# Patient Record
Sex: FEMALE | Race: BLACK OR AFRICAN AMERICAN | Employment: FULL TIME | URBAN - METROPOLITAN AREA
[De-identification: names, ages, dates, MRNs, and addresses within clinical notes are randomized per-mention and may not be internally consistent; named-entity substitution may affect disease eponyms.]

---

## 2022-10-27 ENCOUNTER — OFFICE VISIT (OUTPATIENT)
Dept: FAMILY MEDICINE CLINIC | Facility: CLINIC | Age: 27
End: 2022-10-27
Payer: COMMERCIAL

## 2022-10-27 VITALS
SYSTOLIC BLOOD PRESSURE: 108 MMHG | RESPIRATION RATE: 16 BRPM | HEIGHT: 60.5 IN | BODY MASS INDEX: 27.35 KG/M2 | HEART RATE: 83 BPM | DIASTOLIC BLOOD PRESSURE: 72 MMHG | OXYGEN SATURATION: 98 % | WEIGHT: 143 LBS

## 2022-10-27 DIAGNOSIS — Z23 NEED FOR VACCINATION: ICD-10-CM

## 2022-10-27 DIAGNOSIS — Z00.00 LABORATORY EXAM ORDERED AS PART OF ROUTINE GENERAL MEDICAL EXAMINATION: ICD-10-CM

## 2022-10-27 DIAGNOSIS — Z00.00 WELLNESS EXAMINATION: Primary | ICD-10-CM

## 2022-10-27 PROCEDURE — 90686 IIV4 VACC NO PRSV 0.5 ML IM: CPT | Performed by: FAMILY MEDICINE

## 2022-10-27 PROCEDURE — 3008F BODY MASS INDEX DOCD: CPT | Performed by: FAMILY MEDICINE

## 2022-10-27 PROCEDURE — 3074F SYST BP LT 130 MM HG: CPT | Performed by: FAMILY MEDICINE

## 2022-10-27 PROCEDURE — 99385 PREV VISIT NEW AGE 18-39: CPT | Performed by: FAMILY MEDICINE

## 2022-10-27 PROCEDURE — 90471 IMMUNIZATION ADMIN: CPT | Performed by: FAMILY MEDICINE

## 2022-10-27 PROCEDURE — 3078F DIAST BP <80 MM HG: CPT | Performed by: FAMILY MEDICINE

## 2024-12-26 NOTE — PROGRESS NOTES
Pt transferred to Mother Baby room 2216 in stable condition. Report given to Debi BERRIOS.  Infant transferred with mother in stable condition.

## 2024-12-26 NOTE — PROGRESS NOTES
Received patient ambulatory, alert and oriented x3 to Labor Room 103 for a a scheduled post-dates induction of labor.  To bed.. verbalizes fetal movement... EFM applied. Abdomen palpates soft and non-tender, denies c/o pain or discomfort with palpation. No vaginal bleeding of leaking of fluid noted from introitus. No edema noted, 2+ dtrs. Patient is a 30 y/o  with and YONI of 2024= 40 6/7 weeks gestation. Reports no allergies, medical hx includes anemia during pregnancy. See flowsheet for further assessment.

## 2024-12-26 NOTE — OPERATIVE REPORT
Leila Santizo Patient Status:  Inpatient    1995 MRN [id number]   [facility] LABOR & DELIVERY Attending Se Mckeon MD   Hosp Day # 1 PCP Ariadne Givens MD     Preop Dx:   post dates pregnancy, fetal intolerance to labor  Postop Dx:  Same  Procedure: Primary Low transverse  section   Surgeon: ELSA Mckeon M.D.  Assistant: ABRAHAM Skelton  Anesthesia:   Spinal    Indications:  This patient is a 29 year old y/o  woman presenting for IOL.  The procedures , risks and complications were discussed with the patient including but not limited to infection, hemorrhage, blood transfusion, bowel bladder and ureteral injury, DVT and anesthetic risks.  Her questions were answered.             Procedure:  The surgical assist, Dr. Skelton, was an integral part of the procedure. They were necessary to provide a safe outcome and to aid in adequate hemostasis. The patient was prepped and draped in a sterile fashion after satisfactory spinal anesthesia was given.  A transverse skin incision was made with a scalpel and extended to the fascia.  The fascia was incised in the midline with a scalpel then the incision extended laterally with a hagen scissors.  The fascia was dissected from the muscles both inferiorly and superiorly with sharp and blunt dissection.  The peritoneum was elevated in incised with a ana maria and extended superiorly and inferiorly with good visualization of the bladder , an Dandre retractor was placed in the abdominal cavity and opened. A bladder flap created.  The uterus was incised partway through with a scalpel, entered bluntly with the tip of my finger and the uterine incision was extended bilaterally with cephalocaudal manual traction. Membranes were ruptured bluntly. The head was then delivered and the mouth and nose were suctioned with a bulb syringe.   The remainder of the baby was delivered easily and the cord clamped and cut, then the male infant taken to the warmer  where Neonatology was in attendance.  Cord blood was obtained. Cord gasses were obtained. The placenta was delivered with fundal massage and was normal.  The uterus was left in situ.  The uterine cavity was cleaned and the incision closed in a running locking suture or number one chromic.  Additional sutures of 2-0 chromic were placed for hemostasis as needed.   The paracolic gutters were cleaned and the uterine incision inspected again for hemostasis. The donovan retractor was removed.  After noting excellent subfascial hemostasis, the fascia was closed with a running suture of #1 Vicryl.  The sub Q was inspected, bleeders cauterized and it was closed with running suture of 2-0 plain suture. The skin closed with running  4-0 monocryl sub Q sutures and steri strips.  The patient tolerated the procedure well with an 800 cc EBL and went to recovery in good condition.    Se Mckeon MD  12/26/24

## 2024-12-26 NOTE — PROGRESS NOTES
NURSING ADMISSION NOTE      Patient admitted via Cart  Oriented to room.  Safety precautions initiated.  Bed in low position.  Call light in reach.  Assessment completed. POC discussed with pt and spouse. Both verbalized understanding of POC. Infant to the nursery for assessment.

## 2024-12-26 NOTE — PROGRESS NOTES
OB progress    I assisted Dr. Mckeon with primary  section on this patient.     Jessica Skelton MD

## 2024-12-26 NOTE — PROGRESS NOTES
SUBJECTIVE:   pt without complaints.  Comfortable s/p epidural    OBJECTIVE:  VS:  height is 5' 1\" (1.549 m) and weight is 164 lb (74.4 kg). Her oral temperature is 98.3 °F (36.8 °C). Her blood pressure is 133/80 and her pulse is 102. Her respiration is 17 and oxygen saturation is 97%.   Chest-LCTA B  CVS- RRR no Murmur  Fetal Surveillance:  Fetal heart variability: moderate  Fetal Heart Rate decelerations: late  Baseline FHR: 145 per minute  Uterine contractions: regular, every 2-3 minutes    Cervix:  Dilation:1cm  Effacement:50%  Station:-2    ASSESSMENT/PLAN:    1.29 year old y/o, at 41w0d  2. FWB-concerning due to persistent late decells  3. Procedure to be performed- primary Low Transverse  Section  4. Indication- fetal intolerance to labor  5. The procedure, its risks, benefits, possible complications and alternatives discussed with the patient.  She understands and agrees to the procedure.

## 2024-12-26 NOTE — ANESTHESIA POSTPROCEDURE EVALUATION
[facility] Vito Santizo Patient Status:  Inpatient   Age/Gender 29 year old female MRN [id number]   Location [facility] LABOR & DELIVERY Attending Se Mckeon MD   Hosp Day # 1 PCP Ariadne Givens MD       Anesthesia Post-op Note     SECTION    Procedure Summary       Date: 24 Room / Location:  L+D OR  /  L+D OR    Anesthesia Start: 228 Anesthesia Stop:     Procedure:  SECTION Diagnosis:     Surgeons: Se Mckeon MD Anesthesiologist: Van Higgins MD    Anesthesia Type: spinal ASA Status: 2 - Emergent            Anesthesia Type: spinal    Vitals Value Taken Time   /68 24 0320   Temp 98 24 0322   Pulse 88 24 0322   Resp 14 24 0322   SpO2 100 % 24 0322   Vitals shown include unfiled device data.    Patient Location: PACU    Anesthesia Type: spinal    Airway Patency: patent    Postop Pain Control: adequate    Mental Status: mildly sedated but able to meaningfully participate in the post-anesthesia evaluation    Nausea/Vomiting: none    Cardiopulmonary/Hydration status: stable euvolemic    Complications: no apparent anesthesia related complications    Postop vital signs: stable    Dental Exam: Unchanged from Preop    Patient to be discharged home when criteria met.

## 2024-12-26 NOTE — PLAN OF CARE
Problem: Patient/Family Goals  Goal: Patient/Family Long Term Goal  Description: Uncomplicated vaginal delivery    Interventions:  VS per protocol  I&O  Ice chips and sips as tolerated  EFM per protocol  Maintain IV as ordered  Antibiotics as needed per protocol  Informed consent  2024 by Kamala Hall RN  Outcome: Completed  2024 by Kamala Hall RN  Outcome: Progressing  Goal: Patient/Family Short Term Goal  Description: Adequate pain control with delivery of infant  Interventions:  Pain assessment scores as ordered  Patient scores pain a \"3\" or less  Multidisciplinary care   Nonpharmacologic comfort measures    2024 by Kamala Hall RN  Outcome: Completed  2024 by Kamala Hall RN  Outcome: Progressing     Problem: BIRTH - VAGINAL/ SECTION  Goal: Fetal and maternal status remain reassuring during the birth process  Description: INTERVENTIONS:  - Monitor vital signs  - Monitor fetal heart rate  - Monitor uterine activity  - Monitor labor progression (vaginal delivery)  - DVT prophylaxis (C/S delivery)  - Surgical antibiotic prophylaxis (C/S delivery)  2024 by Kamala Hall RN  Outcome: Completed  2024 by Kamala Hall RN  Outcome: Progressing     Problem: PAIN - ADULT  Goal: Verbalizes/displays adequate comfort level or patient's stated pain goal  Description: INTERVENTIONS:  - Encourage pt to monitor pain and request assistance  - Assess pain using appropriate pain scale  - Administer analgesics based on type and severity of pain and evaluate response  - Implement non-pharmacological measures as appropriate and evaluate response  - Consider cultural and social influences on pain and pain management  - Manage/alleviate anxiety  - Utilize distraction and/or relaxation techniques  - Monitor for opioid side effects  - Notify MD/LIP if interventions unsuccessful or patient reports new pain  - Anticipate increased  pain with activity and pre-medicate as appropriate  12/26/2024 0424 by Kamala Hall, RN  Outcome: Completed  12/25/2024 2220 by Kamala Hall, RN  Outcome: Progressing     Problem: ANXIETY  Goal: Will report anxiety at manageable levels  Description: INTERVENTIONS:  - Administer medication as ordered  - Teach and rehearse alternative coping skills  - Provide emotional support with 1:1 interaction with staff  12/26/2024 0424 by Kamala Hall, RN  Outcome: Completed  12/25/2024 2220 by Kamala Hall, RN  Outcome: Progressing

## 2024-12-26 NOTE — PLAN OF CARE
Problem: Patient/Family Goals  Goal: Patient/Family Long Term Goal  Description: Uncomplicated vaginal delivery    Interventions:  VS per protocol  I&O  Ice chips and sips as tolerated  EFM per protocol  Maintain IV as ordered  Antibiotics as needed per protocol  Informed consent  Outcome: Progressing  Goal: Patient/Family Short Term Goal  Description: Adequate pain control with delivery of infant  Interventions:  Pain assessment scores as ordered  Patient scores pain a \"3\" or less  Multidisciplinary care   Nonpharmacologic comfort measures    Outcome: Progressing     Problem: BIRTH - VAGINAL/ SECTION  Goal: Fetal and maternal status remain reassuring during the birth process  Description: INTERVENTIONS:  - Monitor vital signs  - Monitor fetal heart rate  - Monitor uterine activity  - Monitor labor progression (vaginal delivery)  - DVT prophylaxis (C/S delivery)  - Surgical antibiotic prophylaxis (C/S delivery)  Outcome: Progressing     Problem: PAIN - ADULT  Goal: Verbalizes/displays adequate comfort level or patient's stated pain goal  Description: INTERVENTIONS:  - Encourage pt to monitor pain and request assistance  - Assess pain using appropriate pain scale  - Administer analgesics based on type and severity of pain and evaluate response  - Implement non-pharmacological measures as appropriate and evaluate response  - Consider cultural and social influences on pain and pain management  - Manage/alleviate anxiety  - Utilize distraction and/or relaxation techniques  - Monitor for opioid side effects  - Notify MD/LIP if interventions unsuccessful or patient reports new pain  - Anticipate increased pain with activity and pre-medicate as appropriate  Outcome: Progressing     Problem: ANXIETY  Goal: Will report anxiety at manageable levels  Description: INTERVENTIONS:  - Administer medication as ordered  - Teach and rehearse alternative coping skills  - Provide emotional support with 1:1 interaction with  staff  Outcome: Progressing

## 2024-12-26 NOTE — OPERATIVE REPORT
Leila Santizo Patient Status:  Inpatient    1995 MRN [id number]   [facility] LABOR & DELIVERY Attending Se Mckeon MD   Hosp Day # 1 PCP Ariadne Givens MD     Preop Dx:  previous  section, fetal intolerance to labor, IUGR  Postop Dx:  Same  Procedure: repeat  low transverses  section   Surgeon: ELSA Mckeon M.D.      Assistant: ABRAHAM Caldera  Anesthesia:   Spinal    Indications:  This patient is a 29 year old y/o   woman presenting with IUGR.  The procedures , risks and complications were discussed with the patient including but not limited to infection, hemorrhage, blood transfusion, bowel bladder and ureteral injury, DVT and anesthetic risks.  Her questions were answered.             Procedure:  The patient was prepped and draped in a sterile fashion after satisfactory spinal anesthesia was given.  A transverse skin incision was made with a scalpel and extended to the fascia.  The fascia was incised in the midline with a scalpel then the incision extended laterally with a hagen scissors.  The fascia was dissected from the muscles both inferiorly and superiorly with sharp and blunt dissection.  The peritoneum was elevated in incised with a ana maria and extended superiorly and inferiorly with good visualization of the bladder , an Dandre retractor was placed in the abdominal cavity and opened. A bladder flap created.  The uterus was incised partway through with a scalpel, entered bluntly with the tip of my finger and the uterine incision was extended bilaterally with cephalocaudal manual traction. Membranes were ruptured bluntly.  The head was flexed and lifted to the incision then the mouth and nose were suctioned with a bulb syringe.  The infant was delivered with fundal pressure, the cord clamped and cut, then the male infant taken to the warm where Neonatology was in attendance.  Cord blood was obtained. Cord gasses were not obtained. The placenta was delivered  with fundal massage and was normal.  The uterus was left in situ.  The uterine cavity was cleaned and the incision closed in a running locking suture or number one chromic. Additional sutures of 2-0 chromic were placed for hemostasis as needed.   The paracolic gutters were cleaned and the uterine incision inspected again for hemostasis. The donovan retractor was removed.  After noting excellent subfascial hemostasis, the fascia was closed with a running suture of #1 Vicryl.  The sub Q was inspected, bleeders cauterized and it was closed with running suture of 2-0 plain suture. The skin closed with running  4-0 monocryl sub Q sutures and steri strips.  The patient tolerated the procedure well with an 800 cc EBL and went to recovery in good condition.    Se Mckeon MD  12/26/24

## 2024-12-26 NOTE — H&P
[facility]  Labor and Delivery Prenatal History and Physical Interval Addendum  Please see full Prenatal Record for this pregnancy      SUBJECTIVE:    Interval History:     This is a 29 year old  at 40w6d admitted for IOL due to post dates    Past Medical History:    Anemia    During pregnancy     History reviewed. No pertinent surgical history.   Allergies:  Allergies[1]      OBJECTIVE:  Pulse:  [95-99] 95  SpO2:  [98 %-99 %] 98 %     Fetal Surveillance:  Fetal heart variability: moderate  Fetal Heart Rate decelerations: variable  Fetal Heart Rate accelerations: yes  Baseline FHR: 145 per minute  Uterine contractions:  irreg      Cervix:  Dilation: FT  Effacement:50%  Station:-2    Labs:  Recent Labs   Lab 24  2133   RBC 4.24   HGB 11.4*   HCT 34.5*   MCV 81.4   MCH 26.9   MCHC 33.0   RDW 14.2   NEPRELIM 5.73   WBC 9.1   .0         ASSESSMENT/PLAN:    29 year old  at 40w6d   Reason for admission: post dates IOL  Fetal well being: reassuring   GBS- neg  Cytotec tonight and pitocin in the am         [1] Not on File

## 2024-12-26 NOTE — ANESTHESIA PROCEDURE NOTES
Spinal Block    Date/Time: 12/26/2024 2:31 AM    Performed by: Van Higgins MD  Authorized by: Van Higgins MD      General Information and Staff    Start Time:  12/26/2024 2:31 AM  End Time:  12/26/2024 2:34 AM  Anesthesiologist:  Van Higgins MD  Performed by:  Anesthesiologist  Patient Location:  OB  Site identification: surface landmarks    Reason for Block: at surgeon's request and surgical anesthesia    Preanesthetic Checklist: patient identified, IV checked, risks and benefits discussed, monitors and equipment checked, pre-op evaluation, timeout performed, anesthesia consent and sterile technique used      Procedure Details    Patient Position:  Sitting  Prep: ChloraPrep    Monitoring:  Cardiac monitor, heart rate and continuous pulse ox  Approach:  Midline  Location:  L3-4  Injection Technique:  Single-shot    Needle    Needle Type:  Sprotte  Needle Gauge:  24 G  Needle Length:  3.5 in    Assessment    Sensory Level:   Events: clear CSF, CSF aspirated, well tolerated and blood negative      Additional Comments

## 2024-12-26 NOTE — PAYOR COMM NOTE
--------------  ADMISSION REVIEW     Payor: [employer]  Subscriber #:  [id number]  Authorization Number: N/A    Admit date: 24  Admit time:  8:35 PM       REVIEW DOCUMENTATION:      29 year old  at 40w6d admitted for IOL due to post dates     OBJECTIVE:  Pulse:  [95-99] 95  SpO2:  [98 %-99 %] 98 %      Fetal Surveillance:  Fetal heart variability: moderate  Fetal Heart Rate decelerations: variable  Fetal Heart Rate accelerations: yes  Baseline FHR: 145 per minute  Uterine contractions:  irreg        Cervix:  Dilation: FT  Effacement:50%  Station:-2     Labs:      Recent Labs   Lab 24  2133   RBC 4.24   HGB 11.4*   HCT 34.5*   MCV 81.4   MCH 26.9   MCHC 33.0   RDW 14.2   NEPRELIM 5.73   WBC 9.1   .0            ASSESSMENT/PLAN:     29 year old  at 40w6d   Reason for admission: post dates IOL  Fetal well being: reassuring   GBS- neg  Cytotec tonight and pitocin in the am        24  Preop Dx:   post dates pregnancy, fetal intolerance to labor  Postop Dx:  Same  Procedure: Primary Low transverse  section     MALE  T.O.B.: 02:53  BW 3540 gm  Apgar scores:   8 (-2 color)/9 (-1 color)/9 (@ 1/5/10 min)    MEDICATIONS ADMINISTERED IN LAST 1 DAY:  acetaminophen (Tylenol Extra Strength) tab 1,000 mg       Date Action Dose Route User    2024 0221 Given 1,000 mg Oral Kamala Hall RN          bupivacaine in dextrose (Marcaine) 0.75-8.25 % intrathecal/spinal injection       Date Action Dose Route User    2024 0234 Given 1.5 mL Intrathecal Van Higgins MD          ceFAZolin (Ancef) 2g in 10mL IV syringe premix       Date Action Dose Route User    2024 0232 Given 2 g Intravenous Van Higgins MD          ketorolac (Toradol) 30 MG/ML injection 30 mg       Date Action Dose Route User    2024 0830 Given 30 mg Intravenous So Salinas RN          lactated ringers infusion       Date Action Dose Route User    2024 0413 New Bag (none)  Intravenous Kamala Hall RN    12/26/2024 0255 New Bag (none) Intravenous Van Higgins MD    12/26/2024 0228 Continued by Anesthesia (none) Intravenous Van Higgins MD    12/26/2024 0133 Rate/Dose Change (none) Intravenous Kamala Hall RN    12/26/2024 0108 Rate/Dose Change (none) Intravenous Kamala Hall RN    12/25/2024 2351 New Bag (none) Intravenous Kamala Hall RN    12/25/2024 2326 Rate/Dose Change (none) Intravenous Kamala Hall RN    12/25/2024 2259 Rate/Dose Change (none) Intravenous Kamala Hall RN    12/25/2024 2104 New Bag (none) Intravenous Kamala Hall RN          lidocaine PF (Xylocaine-MPF) 1% injection       Date Action Dose Route User    12/26/2024 0230 Given 2 mL Infiltration Van Higgins MD          misoprostol (CYTOTEC) partial tablets 25 mcg       Date Action Dose Route User    12/25/2024 2129 Given 25 mcg Vaginal Kamala Hall RN          morphINE PF 2 MG/ML injection       Date Action Dose Route User    12/26/2024 0234 Given 0.2 mg Intrathecal Van Higgins MD          ondansetron (Zofran) 4 MG/2ML injection 4 mg       Date Action Dose Route User    12/26/2024 0339 Given 4 mg Intravenous Kamala Hall RN          ondansetron (Zofran) 4 MG/2ML injection       Date Action Dose Route User    12/26/2024 0339 Given 4 mg Intravenous Kamala Hall RN          oxyTOCIN in sodium chloride 0.9% (Pitocin) 30 Units/500mL infusion premix       Date Action Dose Route User    12/26/2024 0255 Given 500 mL Intravenous Van Higgins MD          oxyTOCIN in sodium chloride 0.9% (Pitocin) 30 Units/500mL infusion premix       Date Action Dose Route User    12/26/2024 0500 New Bag 62.5 lucia-units/min Intravenous Kamala Hall RN          sodium citrate-citric acid (Bicitra) 500-334 MG/5ML oral solution 30 mL       Date Action Dose Route User    12/26/2024 0220 Given 30 mL Oral Kamala Hall, RN            Vitals (last day)        Date/Time Temp Pulse Resp BP SpO2 Weight O2 Device O2 Flow Rate (L/min) Who    12/26/24 0950 98.1 °F (36.7 °C) 66 18 136/74 99 % -- -- -- TV    12/26/24 0800 97.9 °F (36.6 °C) 71 20 142/86 97 % -- -- -- HW    12/26/24 0551 -- 80 -- -- 99 % -- -- -- SUJATHA    12/26/24 0550 98.4 °F (36.9 °C) 75 20 133/93 100 % -- -- -- SUJATHA    12/26/24 0549 -- 86 -- -- 100 % -- -- -- SUJATHA    12/26/24 0535 -- 83 17 -- 98 % -- -- -- MP    12/26/24 0530 -- 70 19 136/85 97 % -- -- -- MP    12/26/24 0525 -- 76 12 -- 100 % -- -- -- MP    12/26/24 0520 -- 90 10 -- 100 % -- -- -- MP    12/26/24 0517 97.7 °F (36.5 °C) 84 22 148/95 100 % -- -- --     12/26/24 0319 97.7 °F (36.5 °C) 88 16 127/75 100 % -- None (Room air) -- MP    12/26/24 0227 -- 119 -- -- 99 % -- -- -- MP    12/26/24 0200 -- 102 -- -- 97 % -- -- -- MP    12/26/24 0145 -- 99 -- -- 97 % -- -- -- MP    12/26/24 0133 -- 90 -- -- -- -- -- -- MP    12/26/24 0130 -- 100 17 133/80 98 % -- -- -- MP    12/26/24 0045 -- 111 -- -- 97 % -- -- -- MP    12/26/24 0030 98.3 °F (36.8 °C) 105 18 124/65 99 % -- -- -- MP   \

## 2024-12-26 NOTE — ANESTHESIA PREPROCEDURE EVALUATION
PRE-OP EVALUATION    Patient Name: Leila Santizo    Admit Diagnosis: Pregnancy (HCC)    Pre-op Diagnosis: * No pre-op diagnosis entered *     SECTION    Anesthesia Procedure:  SECTION    Surgeons and Role:     * Se Mckeon MD - Primary     * Jessica Skelton MD - Assisting Surgeon    Pre-op vitals reviewed.  Temp: 98.3 °F (36.8 °C)  Pulse: 102  Resp: 17  BP: 133/80  SpO2: 97 %  Body mass index is 30.99 kg/m².    Current medications reviewed.  Hospital Medications:   [COMPLETED] acetaminophen (Tylenol Extra Strength) tab 1,000 mg  1,000 mg Oral Once    oxyTOCIN in sodium chloride 0.9% (Pitocin) 30 Units/500mL infusion premix  62.5-900 lucia-units/min Intravenous Continuous    [COMPLETED] ceFAZolin (Ancef) 2g in 10mL IV syringe premix  2 g Intravenous Once    oxyTOCIN in sodium chloride 0.9% (Pitocin) 30 Units/500mL infusion premix  62.5 lucia-units/min Intravenous Continuous    ceFAZolin (Ancef) 2 g/10mL IV syringe premix        lactated ringers infusion   Intravenous Continuous    dextrose in lactated ringers 5% infusion   Intravenous PRN    lactated ringers IV bolus 500 mL  500 mL Intravenous PRN    acetaminophen (Tylenol Extra Strength) tab 500 mg  500 mg Oral Q6H PRN    acetaminophen (Tylenol Extra Strength) tab 1,000 mg  1,000 mg Oral Q6H PRN    ibuprofen (Motrin) tab 600 mg  600 mg Oral Once PRN    ondansetron (Zofran) 4 MG/2ML injection 4 mg  4 mg Intravenous Q6H PRN    oxyTOCIN in sodium chloride 0.9% (Pitocin) 30 Units/500mL infusion premix  62.5-900 lucia-units/min Intravenous Continuous    terbutaline (Brethine) 1 MG/ML injection 0.25 mg  0.25 mg Subcutaneous PRN    [COMPLETED] sodium citrate-citric acid (Bicitra) 500-334 MG/5ML oral solution 30 mL  30 mL Oral PRN    HYDROmorphone (Dilaudid) 1 MG/ML injection 1 mg  1 mg Intravenous Q3H PRN       Outpatient Medications:   Prescriptions Prior to Admission[1]    Allergies: Patient has no allergy information on  record.      Anesthesia Evaluation    Patient summary reviewed.    Anesthetic Complications  (-) history of anesthetic complications         GI/Hepatic/Renal    Negative GI/hepatic/renal ROS.                             Cardiovascular        Exercise tolerance: good     MET: >4                                           Endo/Other    Negative endo/other ROS.                              Pulmonary    Negative pulmonary ROS.                       Neuro/Psych    Negative neuro/psych ROS.                                  History reviewed. No pertinent surgical history.  Social History     Socioeconomic History    Marital status:    Tobacco Use    Smoking status: Never     Passive exposure: Never    Smokeless tobacco: Never   Vaping Use    Vaping status: Never Used   Substance and Sexual Activity    Alcohol use: Not Currently     Comment: Social    Drug use: Never     History   Drug Use Unknown     Available pre-op labs reviewed.  Lab Results   Component Value Date    WBC 9.1 12/25/2024    RBC 4.24 12/25/2024    HGB 11.4 (L) 12/25/2024    HCT 34.5 (L) 12/25/2024    MCV 81.4 12/25/2024    MCH 26.9 12/25/2024    MCHC 33.0 12/25/2024    RDW 14.2 12/25/2024    .0 12/25/2024               Airway      Mallampati: II  Mouth opening: 3 FB  TM distance: 4 - 6 cm  Neck ROM: full Cardiovascular    Cardiovascular exam normal.         Dental    Dentition appears grossly intact         Pulmonary    Pulmonary exam normal.                 Other findings              ASA: 2 and emergent  Plan: spinal  NPO status verified and patient meets guidelines.    Post-procedure pain management plan discussed with surgeon and patient.      Plan/risks discussed with: patient and spouse                Present on Admission:  **None**             [1]   Medications Prior to Admission   Medication Sig Dispense Refill Last Dose/Taking    prenatal vitamin with DHA 27-0.8-228 MG Oral Cap Take 1 capsule by mouth daily.   12/24/2024 at 11:00 AM     Ferrous Fumarate 325 (106 Fe) MG Oral Tab Take 1 tablet by mouth daily.   12/24/2024 at 11:00 AM    acetaminophen 500 MG Oral Tab Take 1 tablet (500 mg total) by mouth as needed for Pain.   12/21/2024

## 2024-12-27 NOTE — PROGRESS NOTES
Postop Day 1    Pt without complaints.     Temp:  [97.6 °F (36.4 °C)-98.4 °F (36.9 °C)] 98.4 °F (36.9 °C)  Pulse:  [66-87] 87  Resp:  [16-20] 16  BP: (112-138)/(59-88) 122/88  SpO2:  [98 %-100 %] 98 %    Intake/Output Summary (Last 24 hours) at 12/27/2024 0847  Last data filed at 12/26/2024 2205  Gross per 24 hour   Intake 1519 ml   Output 1635 ml   Net -116 ml     abd  soft, NT, ND, fundus firm below umbilicus, incision C/D/I  extr  trace edema, no calf tenderness  Recent Labs   Lab 12/25/24  2133 12/27/24  1013   RBC 4.24 3.60*   HGB 11.4* 10.0*   HCT 34.5* 29.2*   MCV 81.4 81.1   MCH 26.9 27.8   MCHC 33.0 34.2   RDW 14.2 13.9   NEPRELIM 5.73 9.13*   WBC 9.1 11.9*   .0 200.0     Impression: POD#1  Plan:  Ambulation encouraged.    Advance diet as tolerated.    Continue postpartum care.    Circumcision deferred, infant in NICU.

## 2024-12-27 NOTE — PROGRESS NOTES
[facility] 2SW-J n    Leila Santizo Patient Status:  Inpatient   Age/Gender 29 year old female MRN [id number]   Location [facility]SW-[facility] Attending Se Mckeon MD   Hosp Day # 2 PCP Ariadne Givens MD      Anesthesia Pain Progress Note    Anesthesia Technique:   Spinal Anesthesia     Pain Management Technique:  In addition to available oral supplemental and IV medications  Patient received neuraxial preservative free morphine for post procedural pain control.    Post Procedure Pain Quality:    Adequate    Pain Management Side Effects:  None     /59 (BP Location: Right arm)   Pulse 79   Temp 97.8 °F (36.6 °C) (Oral)   Resp 16   Ht 1.549 m (5' 1\")   Wt 74.4 kg (164 lb)   LMP 2024   SpO2 98%   Breastfeeding Yes   BMI 30.99 kg/m²            Complications from Pain Management or Anesthesia:   None    All patient questions were answered.  Follow up pain management is separate from intraoperative anesthetic needs.  Pain care is transitioned to primary service, with management by oral medications.    Thank you for asking us to participate in the care of your patient.    Raj Houston DO, 24, 8:36 AM      Raj Houston DO, 24, 8:36 AM

## 2024-12-28 NOTE — PROGRESS NOTES
Postop Day 2    Pt without complaints.     Temp: 98.2 °F (36.8 °C)  Pulse: 91  Resp: 18  BP: 128/68  abd  soft, NT, ND, fundus firm below umbilicus, incision C/D/I  extr  trace edema, no calf tenderness    Impression: POD#2  Plan:  Continue postop care.    Anticipate discharge home today or tomorrow, pending baby's discharge (in NICU). F/u in office in 2 wks.

## 2024-12-29 NOTE — PLAN OF CARE
Problem: POSTPARTUM  Goal: Long Term Goal:Experiences normal postpartum course  Description: INTERVENTIONS:  - Assess and monitor vital signs and lab values.  - Assess fundus and lochia.  - Provide ice/sitz baths for perineum discomfort.  - Monitor healing of incision/episiotomy/laceration, and assess for signs and symptoms of infection and hematoma.  - Assess bladder function and monitor for bladder distention.  - Provide/instruct/assist with pericare as needed.  - Provide VTE prophylaxis as needed.  - Monitor bowel function.  - Encourage ambulation and provide assistance as needed.  - Assess and monitor emotional status and provide social service/psych resources as needed.  - Utilize standard precautions and use personal protective equipment as indicated. Ensure aseptic care of all intravenous lines and invasive tubes/drains.  - Obtain immunization and exposure to communicable diseases history.  12/29/2024 1132 by Melanie Macdonald, RN  Outcome: Completed  12/29/2024 0848 by Melanie Macdonald, RN  Outcome: Progressing  Goal: Optimize infant feeding at the breast  Description: INTERVENTIONS:  - Initiate breast feeding within first hour after birth.   - Monitor effectiveness of current breast feeding efforts.  - Assess support systems available to mother/family.  - Identify cultural beliefs/practices regarding lactation, letdown techniques, maternal food preferences.  - Assess mother's knowledge and previous experience with breast feeding.  - Provide information as needed about early infant feeding cues (e.g., rooting, lip smacking, sucking fingers/hand) versus late cue of crying.  - Discuss/demonstrate breast feeding aids (e.g., infant sling, nursing footstool/pillows, and breast pumps).  - Encourage mother/other family members to express feelings/concerns, and actively listen.  - Educate father/SO about benefits of breast feeding and how to manage common lactation challenges.  - Recommend avoidance of specific  medications or substances incompatible with breast feeding.  - Assess and monitor for signs of nipple pain/trauma.  - Instruct and provide assistance with proper latch.  - Review techniques for milk expression (breast pumping) and storage of breast milk. Provide pumping equipment/supplies, instructions and assistance, as needed.  - Encourage rooming-in and breast feeding on demand.  - Encourage skin-to-skin contact.  - Provide LC support as needed.  - Assess for and manage engorgement.  - Provide breast feeding education handouts and information on community breast feeding support.   2024 by Melanie Macdonald RN  Outcome: Completed  2024 by Melanie Macdonald RN  Outcome: Progressing  Goal: Establishment of adequate milk supply with medication/procedure interruptions  Description: INTERVENTIONS:  - Review techniques for milk expression (breast pumping).   - Provide pumping equipment/supplies, instructions, and assistance until it is safe to breastfeed infant.  2024 by Melanie Macdonald RN  Outcome: Completed  2024 by Melanie Macdonald RN  Outcome: Progressing  Goal: Appropriate maternal -  bonding  Description: INTERVENTIONS:  - Assess caregiver- interactions.  - Assess caregiver's emotional status and coping mechanisms.  - Encourage caregiver to participate in  daily care.  - Assess support systems available to mother/family.  - Provide /case management support as needed.  2024 by Melanie Macdonald RN  Outcome: Completed  2024 by Melanie Macdonald RN  Outcome: Progressing

## 2024-12-29 NOTE — DISCHARGE INSTRUCTIONS
While you were here we were administering these following medications to you:     Ibuprofen 600 mg every 6 hours alternated with Tylenol 1000 mg every 6 hours

## 2024-12-29 NOTE — PROGRESS NOTES
NURSING DISCHARGE NOTE    Discharge instructions reviewed with patient and . Patient encouraged to ask questions and discharge paperwork provided. Teal band explained and given to patient. Patient encouraged to make follow up appointment with OB/GYN for 2 weeks postpartum.

## 2024-12-29 NOTE — PROGRESS NOTES
Postop Day 3    Pt without complaints. Baby in NICU.    Temp: 98 °F (36.7 °C)  Pulse: 83  Resp: 12  BP: 129/89  abd  soft, NT, ND, fundus firm below umbilicus, incision C/D/I  extr  trace edema, no calf tenderness    Impression: POD#3  Plan:  Discharge instructions reviewed.    Home today.    Follow-up at 2 wks and 6 wks postpartum.

## 2024-12-31 NOTE — PROGRESS NOTES
Reviewed self and infant care with mom.  She verbalizes understanding of instructions reviewed.  Encouraged to follow up with MDs as directed and with questions/concerns.  Denies PPD.  Baby currently in NICU.

## 2024-12-31 NOTE — DISCHARGE SUMMARY
[facility]  Discharge Summary    Leila Santizo Patient Status:  Inpatient    1995 MRN [id number]   Location [facility] 2SW-J Attending No att. providers found   Hosp Day # 4 PCP Ariadne Givens MD     Date of Admission: 2024    Date of Discharge: 2024  5:13 PM    Admitting Diagnosis: Pregnancy ([facility])    Discharge Diagnosis:   Patient Active Problem List   Diagnosis    Pregnancy ([facility])    Non-reassuring fetal heart rate with late deceleration (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)    Post-term pregnancy, 40-42 weeks of gestation ([facility])       Reason for Admission: IOL      Hospital Course: LTCS performed for fetal intolerance to labor; routine post op course.        Procedures: LTCS    Complications: none    Disposition: Home or Self Care    Discharge Condition: Stable    Discharge Medications:   Discharge Medication List as of 2024  9:44 AM        CONTINUE these medications which have NOT CHANGED    Details   prenatal vitamin with DHA 27-0.8-228 MG Oral Cap Take 1 capsule by mouth daily., Historical      Ferrous Fumarate 325 (106 Fe) MG Oral Tab Take 1 tablet by mouth daily., Historical      acetaminophen 500 MG Oral Tab Take 1 tablet (500 mg total) by mouth as needed for Pain., Historical                         Criss Echols MD  2024  3:49 PM

## 2025-01-08 NOTE — ED INITIAL ASSESSMENT (HPI)
Pt through triage with rectal pain. Noticed hemorrhoid enlarged after BM yesterday. Tried to use warm compress, has not taken and OTC medications for pain. Hemorrhoid noticed after giving birth on 12/26/24. Pt denies vaginal bleeding, N/V/D.
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
## (undated) NOTE — LETTER
Date: 10/27/2022    Patient Name: Beverley Phan          To Whom it may concern:     This letter has been written at the patient's request. The above patient was seen at the [facility] today 10/27/2022 for her Annual Physical.            Sincerely,    Teresita Mallory MD